# Patient Record
Sex: MALE | URBAN - METROPOLITAN AREA
[De-identification: names, ages, dates, MRNs, and addresses within clinical notes are randomized per-mention and may not be internally consistent; named-entity substitution may affect disease eponyms.]

---

## 2017-02-06 ENCOUNTER — IMPORTED ENCOUNTER (OUTPATIENT)
Dept: URBAN - METROPOLITAN AREA CLINIC 50 | Facility: CLINIC | Age: 77
End: 2017-02-06

## 2020-01-13 NOTE — PATIENT DISCUSSION
This visual field clearly demonstrated a minimum of 52% loss of upper field of vision OU, with upper lid skin in repose and elevated by taping of the lid to demonstrate potential correction. This field shows that taping the lids significantly improved this patient's superior field of vision by approximately 43%, OU.

## 2020-07-28 NOTE — PATIENT DISCUSSION
The patient was informed that with [address] [facility] for distance, they will need glasses for all near and intermediate activities after surgery. The patient understands there is a possibility they may need an enhancement after surgery. The patient elects [employer] OD, goal of emmetropia.

## 2020-08-04 NOTE — PATIENT DISCUSSION
The patient was informed that with [address] [facility] for distance, they will need glasses for all near and intermediate activities after surgery. The patient understands there is a possibility they may need an enhancement after surgery. The patient elects [employer] OD, goal of emmetropia.

## 2020-08-04 NOTE — PATIENT DISCUSSION
The patient was informed that with [address] [facility] for distance, they will need glasses for all near and intermediate activities after surgery. The patient understands there is a possibility they may need an enhancement after surgery. The patient elects [employer] OD, goal of emmetropia.

## 2020-08-05 NOTE — PATIENT DISCUSSION
Cataract surgery has been performed in the first eye and activities of daily living are still impaired. The patient would like to proceed with cataract surgery in the second eye as scheduled. The patient elects IOL OS CV Goal Emmetropia.

## 2021-04-08 NOTE — PATIENT DISCUSSION
This visual field clearly demonstrated a minimum of 54% loss of upper field of vision OU, with upper lid skin in repose and elevated by taping of the lid to demonstrate potential correction. This field shows that taping the lids significantly improved this patient's superior field of vision by approximately 54%, OU.

## 2021-04-08 NOTE — PATIENT DISCUSSION
Recommend Bilateral lower lid blepharoplasty trans conj laser (discussed risks and benefits of sx. ..).  Excise/discard hemangioma RLL.

## 2021-04-17 ASSESSMENT — TONOMETRY
OD_IOP_MMHG: 14
OS_IOP_MMHG: 15

## 2021-04-17 ASSESSMENT — VISUAL ACUITY
OS_CC: 20/25-
OD_CC: J1
OD_CC: 20/25
OS_CC: J1

## 2022-02-28 NOTE — PATIENT DISCUSSION
[birth year]        [facility]      Dear Alysia Sharpe records indicate that you have outstanding lab work and or testing that was ordered for you and has not yet been completed:  Orders Placed This Encounter       M Discussed condition and exacerbating conditions/situations (e.g., dry/arid environments, overhead fans, air conditioners, side effect of medications).
